# Patient Record
Sex: FEMALE | Race: BLACK OR AFRICAN AMERICAN
[De-identification: names, ages, dates, MRNs, and addresses within clinical notes are randomized per-mention and may not be internally consistent; named-entity substitution may affect disease eponyms.]

---

## 2019-02-21 ENCOUNTER — HOSPITAL ENCOUNTER (EMERGENCY)
Age: 44
LOS: 1 days | Discharge: HOME | End: 2019-02-22
Payer: MEDICAID

## 2019-02-21 VITALS
TEMPERATURE: 99.4 F | DIASTOLIC BLOOD PRESSURE: 76 MMHG | SYSTOLIC BLOOD PRESSURE: 119 MMHG | HEART RATE: 99 BPM | OXYGEN SATURATION: 99 % | RESPIRATION RATE: 18 BRPM

## 2019-02-21 VITALS — BODY MASS INDEX: 24.9 KG/M2

## 2019-02-21 DIAGNOSIS — J02.0: Primary | ICD-10-CM

## 2019-02-21 DIAGNOSIS — Z72.0: ICD-10-CM

## 2019-02-21 PROCEDURE — 87804 INFLUENZA ASSAY W/OPTIC: CPT

## 2019-02-21 PROCEDURE — 99283 EMERGENCY DEPT VISIT LOW MDM: CPT

## 2019-02-22 VITALS
HEART RATE: 89 BPM | RESPIRATION RATE: 20 BRPM | SYSTOLIC BLOOD PRESSURE: 116 MMHG | OXYGEN SATURATION: 99 % | DIASTOLIC BLOOD PRESSURE: 70 MMHG

## 2020-01-08 ENCOUNTER — HOSPITAL ENCOUNTER (OUTPATIENT)
Age: 45
End: 2020-01-08
Payer: MEDICAID

## 2020-01-08 VITALS — BODY MASS INDEX: 24.9 KG/M2

## 2020-01-08 DIAGNOSIS — Z12.4: Primary | ICD-10-CM

## 2020-01-08 PROCEDURE — 88175 CYTOPATH C/V AUTO FLUID REDO: CPT

## 2020-01-08 PROCEDURE — G0145 SCR C/V CYTO,THINLAYER,RESCR: HCPCS

## 2020-12-01 ENCOUNTER — HOSPITAL ENCOUNTER (OUTPATIENT)
Age: 45
End: 2020-12-01
Payer: MEDICAID

## 2020-12-01 VITALS — BODY MASS INDEX: 24.9 KG/M2

## 2020-12-01 DIAGNOSIS — L29.9: Primary | ICD-10-CM

## 2020-12-01 DIAGNOSIS — N03.8: ICD-10-CM

## 2020-12-01 LAB
ALANINE AMINOTRANSFER ALT/SGPT: 19 U/L (ref 13–56)
ALBUMIN SERPL-MCNC: 3.4 G/DL (ref 3.2–5)
ALKALINE PHOSPHATASE: 47 U/L (ref 45–117)
ANION GAP: 5 (ref 5–15)
AST(SGOT): 23 U/L (ref 15–37)
BUN SERPL-MCNC: 21 MG/DL (ref 7–18)
BUN/CREAT RATIO: 12.4 RATIO (ref 10–20)
CALCIUM SERPL-MCNC: 8.5 MG/DL (ref 8.5–10.1)
CARBON DIOXIDE: 23 MMOL/L (ref 21–32)
CHLORIDE: 112 MMOL/L (ref 98–107)
DEPRECATED RDW RBC: 42.1 FL (ref 35.1–43.9)
ERYTHROCYTE [DISTWIDTH] IN BLOOD: 13 % (ref 11.6–14.6)
EST GLOM FILT RATE - AFR AMER: 42 ML/MIN (ref 60–?)
FERRITIN SERPL-MCNC: 85 NG/ML (ref 8–252)
GLOBULIN: 4.3 G/DL (ref 2.2–4.2)
GLUCOSE: 81 MG/DL (ref 74–106)
HCT VFR BLD AUTO: 33.8 % (ref 37–47)
HEMOGLOBIN: 11.3 G/DL (ref 12–15)
HGB BLD-MCNC: 11.3 G/DL (ref 12–15)
IMMATURE GRANULOCYTES COUNT: 0.01 X10^3/UL (ref 0–0)
IRON SPEC-MCNT: 45 UG/DL (ref 50–170)
MCV RBC: 88.3 FL (ref 81–99)
MEAN CORP HGB CONC: 33.4 G/DL (ref 32–36)
MEAN PLATELET VOL.: 11.5 FL (ref 6.2–12)
NRBC FLAGGED BY ANALYZER: 0 % (ref 0–5)
PLATELET # BLD: 194 K/MM3 (ref 150–450)
PLATELET COUNT: 194 K/MM3 (ref 150–450)
POTASSIUM: 3.7 MMOL/L (ref 3.5–5.1)
RBC # BLD AUTO: 3.83 M/MM3 (ref 4.2–5.4)
RBC DISTRIBUTION WIDTH CV: 13 % (ref 11.6–14.6)
RBC DISTRIBUTION WIDTH SD: 42.1 FL (ref 35.1–43.9)
VITAMIN D,25 HYDROXY: 24.5 NG/ML
WBC # BLD AUTO: 6.8 K/MM3 (ref 4.4–11)
WHITE BLOOD COUNT: 6.8 K/MM3 (ref 4.4–11)

## 2020-12-01 PROCEDURE — 80053 COMPREHEN METABOLIC PANEL: CPT

## 2020-12-01 PROCEDURE — 84443 ASSAY THYROID STIM HORMONE: CPT

## 2020-12-01 PROCEDURE — 82728 ASSAY OF FERRITIN: CPT

## 2020-12-01 PROCEDURE — 83540 ASSAY OF IRON: CPT

## 2020-12-01 PROCEDURE — 85025 COMPLETE CBC W/AUTO DIFF WBC: CPT

## 2020-12-01 PROCEDURE — 82306 VITAMIN D 25 HYDROXY: CPT

## 2020-12-01 PROCEDURE — 85652 RBC SED RATE AUTOMATED: CPT

## 2020-12-01 PROCEDURE — 86003 ALLG SPEC IGE CRUDE XTRC EA: CPT

## 2020-12-01 PROCEDURE — 36415 COLL VENOUS BLD VENIPUNCTURE: CPT

## 2020-12-05 LAB
AMER ROACH IGE QN: <0.1 KU/L
CAT DANDER IGE QN: <0.1 KU/L
COMMON RAGWEED IGE QN: <0.1 KU/L
DEPRECATED IGE QN: <0.1 KU/L
DOG EPITH IGE QN: <0.1 KU/L
HAZELNUT POLN IGE QN: <0.1 KU/L
KENT BLUE GRASS IGE QN: <0.1 KU/L
MT JUNIPER IGE QN: <0.1 KU/L
PLANTAIN, ENGLISH: <0.1 KU/L
SHEEP SORREL IGE QN: <0.1 KU/L
SYCAMORE, AMERICAN: <0.1 KU/L
WHITE ELM IGE QN: <0.1 KU/L
WHITE HICKORY IGE QN: <0.1 KU/L
WHITE MULBERRY IGE QN: <0.1 KU/L
WHITE OAK IGE QN: <0.1 KU/L

## 2022-03-31 ENCOUNTER — HOSPITAL ENCOUNTER (OUTPATIENT)
Dept: HOSPITAL 100 - MFPLAB | Age: 47
Discharge: HOME | End: 2022-03-31
Payer: MEDICAID

## 2022-03-31 DIAGNOSIS — R53.83: Primary | ICD-10-CM

## 2022-03-31 DIAGNOSIS — Z13.220: ICD-10-CM

## 2022-03-31 LAB
ALANINE AMINOTRANSFER ALT/SGPT: 17 U/L (ref 13–56)
ALBUMIN SERPL-MCNC: 3.5 G/DL (ref 3.2–5)
ALKALINE PHOSPHATASE: 62 U/L (ref 45–117)
ANION GAP: 2 (ref 5–15)
AST(SGOT): 18 U/L (ref 15–37)
BUN SERPL-MCNC: 24 MG/DL (ref 7–18)
BUN/CREAT RATIO: 8.8 RATIO (ref 10–20)
CALCIUM SERPL-MCNC: 8.5 MG/DL (ref 8.5–10.1)
CARBON DIOXIDE: 22 MMOL/L (ref 21–32)
CHLORIDE: 115 MMOL/L (ref 98–107)
CHOLEST SERPL-MCNC: 199 MG/DL
DEPRECATED RDW RBC: 40.4 FL (ref 35.1–43.9)
ERYTHROCYTE [DISTWIDTH] IN BLOOD: 12.7 % (ref 11.6–14.6)
EST GLOM FILT RATE - AFR AMER: 24 ML/MIN (ref 60–?)
GLOBULIN: 4 G/DL (ref 2.2–4.2)
GLUCOSE: 95 MG/DL (ref 74–106)
HCT VFR BLD AUTO: 32.8 % (ref 37–47)
HEMOGLOBIN: 11.2 G/DL (ref 12–15)
HGB BLD-MCNC: 11.2 G/DL (ref 12–15)
IMMATURE GRANULOCYTES COUNT: 0.02 X10^3/UL (ref 0–0)
IRON SPEC-MCNT: 67 UG/DL (ref 50–170)
MCV RBC: 87 FL (ref 81–99)
MEAN CORP HGB CONC: 34.1 G/DL (ref 32–36)
MEAN PLATELET VOL.: 11.2 FL (ref 6.2–12)
NRBC FLAGGED BY ANALYZER: 0 % (ref 0–5)
PLATELET # BLD: 233 K/MM3 (ref 150–450)
PLATELET COUNT: 233 K/MM3 (ref 150–450)
POTASSIUM: 3.8 MMOL/L (ref 3.5–5.1)
RBC # BLD AUTO: 3.77 M/MM3 (ref 4.2–5.4)
RBC DISTRIBUTION WIDTH CV: 12.7 % (ref 11.6–14.6)
RBC DISTRIBUTION WIDTH SD: 40.4 FL (ref 35.1–43.9)
TRIGLYCERIDES: 80 MG/DL
VITAMIN B12: 555 PG/ML (ref 211–911)
VITAMIN D,25 HYDROXY: 14.3 NG/ML
VLDLC SERPL-MCNC: 16 MG/DL (ref 5–40)
WBC # BLD AUTO: 7.1 K/MM3 (ref 4.4–11)
WHITE BLOOD COUNT: 7.1 K/MM3 (ref 4.4–11)

## 2022-03-31 PROCEDURE — 83540 ASSAY OF IRON: CPT

## 2022-03-31 PROCEDURE — 85652 RBC SED RATE AUTOMATED: CPT

## 2022-03-31 PROCEDURE — 80053 COMPREHEN METABOLIC PANEL: CPT

## 2022-03-31 PROCEDURE — 36415 COLL VENOUS BLD VENIPUNCTURE: CPT

## 2022-03-31 PROCEDURE — 82306 VITAMIN D 25 HYDROXY: CPT

## 2022-03-31 PROCEDURE — 85025 COMPLETE CBC W/AUTO DIFF WBC: CPT

## 2022-03-31 PROCEDURE — 86038 ANTINUCLEAR ANTIBODIES: CPT

## 2022-03-31 PROCEDURE — 82607 VITAMIN B-12: CPT

## 2022-03-31 PROCEDURE — 84443 ASSAY THYROID STIM HORMONE: CPT

## 2022-03-31 PROCEDURE — 80061 LIPID PANEL: CPT

## 2023-02-23 ENCOUNTER — HOSPITAL ENCOUNTER (OUTPATIENT)
Dept: HOSPITAL 100 - MFPLAB | Age: 48
Discharge: HOME | End: 2023-02-23
Payer: MEDICAID

## 2023-02-23 DIAGNOSIS — D64.9: Primary | ICD-10-CM

## 2023-02-23 DIAGNOSIS — R68.89: ICD-10-CM

## 2023-02-23 LAB
ANION GAP: 8 (ref 5–15)
BUN SERPL-MCNC: 57 MG/DL (ref 7–18)
BUN/CREAT RATIO: 9.7 RATIO (ref 10–20)
CALCIUM SERPL-MCNC: 7.7 MG/DL (ref 8.5–10.1)
CARBON DIOXIDE: 18 MMOL/L (ref 21–32)
CHLORIDE: 114 MMOL/L (ref 98–107)
DEPRECATED RDW RBC: 43.8 FL (ref 35.1–43.9)
ERYTHROCYTE [DISTWIDTH] IN BLOOD: 13.4 % (ref 11.6–14.6)
EST GLOM FILT RATE - AFR AMER: 10 ML/MIN (ref 60–?)
FERRITIN SERPL-MCNC: 150 NG/ML (ref 8–252)
GLUCOSE: 115 MG/DL (ref 74–106)
HCT VFR BLD AUTO: 25.2 % (ref 37–47)
HEMOGLOBIN: 8 G/DL (ref 12–15)
HGB BLD-MCNC: 8 G/DL (ref 12–15)
IMMATURE GRANULOCYTES COUNT: 0.02 X10^3/UL (ref 0–0)
IRON SPEC-MCNT: 72 UG/DL (ref 50–170)
MCV RBC: 89.4 FL (ref 81–99)
MEAN CORP HGB CONC: 31.7 G/DL (ref 32–36)
MEAN PLATELET VOL.: 11.7 FL (ref 6.2–12)
NRBC FLAGGED BY ANALYZER: 0 % (ref 0–5)
PLATELET # BLD: 205 K/MM3 (ref 150–450)
PLATELET COUNT: 205 K/MM3 (ref 150–450)
POTASSIUM: 3.5 MMOL/L (ref 3.5–5.1)
RBC # BLD AUTO: 2.82 M/MM3 (ref 4.2–5.4)
RBC DISTRIBUTION WIDTH CV: 13.4 % (ref 11.6–14.6)
RBC DISTRIBUTION WIDTH SD: 43.8 FL (ref 35.1–43.9)
WBC # BLD AUTO: 7.3 K/MM3 (ref 4.4–11)
WHITE BLOOD COUNT: 7.3 K/MM3 (ref 4.4–11)

## 2023-02-23 PROCEDURE — 36415 COLL VENOUS BLD VENIPUNCTURE: CPT

## 2023-02-23 PROCEDURE — 80048 BASIC METABOLIC PNL TOTAL CA: CPT

## 2023-02-23 PROCEDURE — 82728 ASSAY OF FERRITIN: CPT

## 2023-02-23 PROCEDURE — 85025 COMPLETE CBC W/AUTO DIFF WBC: CPT

## 2023-02-23 PROCEDURE — 85045 AUTOMATED RETICULOCYTE COUNT: CPT

## 2023-02-23 PROCEDURE — 83550 IRON BINDING TEST: CPT

## 2023-02-23 PROCEDURE — 83540 ASSAY OF IRON: CPT

## 2023-02-23 PROCEDURE — 84443 ASSAY THYROID STIM HORMONE: CPT

## 2023-02-24 LAB
IMMATURE RETICULOCYTE FRACTION: 10.5 % (ref 3–15.9)
IRON BINDING CAPACITY,TOTAL: 246 UG/DL (ref 250–450)
RETICS # AUTO: 1.31 % (ref 0.5–1.5)

## 2024-01-09 ENCOUNTER — HOSPITAL ENCOUNTER (OUTPATIENT)
Dept: HOSPITAL 100 - MFPLAB | Age: 49
Discharge: HOME | End: 2024-01-09
Payer: MEDICAID

## 2024-01-09 DIAGNOSIS — D64.9: ICD-10-CM

## 2024-01-09 DIAGNOSIS — J30.9: Primary | ICD-10-CM

## 2024-01-09 LAB
DEPRECATED RDW RBC: 42.1 FL (ref 35.1–43.9)
ERYTHROCYTE [DISTWIDTH] IN BLOOD: 13.7 % (ref 11.6–14.6)
FERRITIN SERPL-MCNC: 175 NG/ML (ref 8–252)
HCT VFR BLD AUTO: 20.2 % (ref 37–47)
HGB BLD-MCNC: 6.5 G/DL (ref 12–15)
IRON SPEC-MCNT: 89 UG/DL (ref 50–170)
MCV RBC: 84.5 FL (ref 81–99)
MEAN CORP HGB CONC: 32.2 G/DL (ref 32–36)
MEAN PLATELET VOL.: 11.9 FL (ref 6.2–12)
PLATELET # BLD: 157 K/MM3 (ref 150–450)
RBC # BLD AUTO: 2.39 M/MM3 (ref 4.2–5.4)
WBC # BLD AUTO: 7.4 K/MM3 (ref 4.4–11)

## 2024-01-09 PROCEDURE — 85027 COMPLETE CBC AUTOMATED: CPT

## 2024-01-09 PROCEDURE — 83540 ASSAY OF IRON: CPT

## 2024-01-09 PROCEDURE — 36415 COLL VENOUS BLD VENIPUNCTURE: CPT

## 2024-01-09 PROCEDURE — 82728 ASSAY OF FERRITIN: CPT

## 2024-01-10 ENCOUNTER — HOSPITAL ENCOUNTER (INPATIENT)
Dept: HOSPITAL 100 - ED | Age: 49
LOS: 6 days | Discharge: HOME | DRG: 470 | End: 2024-01-16
Payer: MEDICAID

## 2024-01-10 VITALS
SYSTOLIC BLOOD PRESSURE: 165 MMHG | RESPIRATION RATE: 12 BRPM | OXYGEN SATURATION: 100 % | HEART RATE: 70 BPM | DIASTOLIC BLOOD PRESSURE: 95 MMHG

## 2024-01-10 VITALS — BODY MASS INDEX: 23.1 KG/M2 | BODY MASS INDEX: 20.8 KG/M2 | BODY MASS INDEX: 21 KG/M2

## 2024-01-10 VITALS — HEART RATE: 71 BPM | DIASTOLIC BLOOD PRESSURE: 96 MMHG | SYSTOLIC BLOOD PRESSURE: 187 MMHG

## 2024-01-10 VITALS — DIASTOLIC BLOOD PRESSURE: 88 MMHG | HEART RATE: 70 BPM | RESPIRATION RATE: 17 BRPM | SYSTOLIC BLOOD PRESSURE: 173 MMHG

## 2024-01-10 VITALS — SYSTOLIC BLOOD PRESSURE: 187 MMHG | HEART RATE: 66 BPM | DIASTOLIC BLOOD PRESSURE: 100 MMHG

## 2024-01-10 VITALS
SYSTOLIC BLOOD PRESSURE: 184 MMHG | DIASTOLIC BLOOD PRESSURE: 91 MMHG | HEART RATE: 63 BPM | RESPIRATION RATE: 15 BRPM | OXYGEN SATURATION: 100 %

## 2024-01-10 VITALS
TEMPERATURE: 96.9 F | RESPIRATION RATE: 18 BRPM | SYSTOLIC BLOOD PRESSURE: 179 MMHG | DIASTOLIC BLOOD PRESSURE: 97 MMHG | OXYGEN SATURATION: 100 % | HEART RATE: 70 BPM

## 2024-01-10 VITALS — TEMPERATURE: 98.4 F

## 2024-01-10 VITALS
RESPIRATION RATE: 13 BRPM | SYSTOLIC BLOOD PRESSURE: 160 MMHG | HEART RATE: 64 BPM | OXYGEN SATURATION: 100 % | DIASTOLIC BLOOD PRESSURE: 91 MMHG

## 2024-01-10 VITALS
HEART RATE: 89 BPM | RESPIRATION RATE: 14 BRPM | DIASTOLIC BLOOD PRESSURE: 70 MMHG | SYSTOLIC BLOOD PRESSURE: 138 MMHG | OXYGEN SATURATION: 100 %

## 2024-01-10 DIAGNOSIS — Z79.899: ICD-10-CM

## 2024-01-10 DIAGNOSIS — N18.6: ICD-10-CM

## 2024-01-10 DIAGNOSIS — E83.51: ICD-10-CM

## 2024-01-10 DIAGNOSIS — L29.9: ICD-10-CM

## 2024-01-10 DIAGNOSIS — E87.22: ICD-10-CM

## 2024-01-10 DIAGNOSIS — R20.0: ICD-10-CM

## 2024-01-10 DIAGNOSIS — E83.39: ICD-10-CM

## 2024-01-10 DIAGNOSIS — Z99.2: ICD-10-CM

## 2024-01-10 DIAGNOSIS — R19.7: ICD-10-CM

## 2024-01-10 DIAGNOSIS — N17.9: ICD-10-CM

## 2024-01-10 DIAGNOSIS — D63.1: ICD-10-CM

## 2024-01-10 DIAGNOSIS — F17.200: ICD-10-CM

## 2024-01-10 DIAGNOSIS — I12.0: Primary | ICD-10-CM

## 2024-01-10 LAB
ALANINE AMINOTRANSFER ALT/SGPT: 8 U/L (ref 13–56)
ALBUMIN SERPL-MCNC: 3.1 G/DL (ref 3.2–5)
ALKALINE PHOSPHATASE: 127 U/L (ref 45–117)
ANION GAP: 14 (ref 5–15)
AST(SGOT): 13 U/L (ref 15–37)
BUN SERPL-MCNC: 93 MG/DL (ref 7–18)
BUN/CREAT RATIO: 4.9 RATIO (ref 10–20)
CALCIUM SERPL-MCNC: < 5 MG/DL (ref 8.5–10.1)
CARBON DIOXIDE: 12 MMOL/L (ref 21–32)
CHLORIDE: 104 MMOL/L (ref 98–107)
DEPRECATED RDW RBC: 41.1 FL (ref 35.1–43.9)
ERYTHROCYTE [DISTWIDTH] IN BLOOD: 13.8 % (ref 11.6–14.6)
EST GLOM FILT RATE - AFR AMER: 3 ML/MIN (ref 60–?)
ESTIMATED CREATININE CLEARANCE: 3.11 ML/MIN
GLOBULIN: 3.6 G/DL (ref 2.2–4.2)
GLUCOSE: 94 MG/DL (ref 74–106)
HCT VFR BLD AUTO: 19.6 % (ref 37–47)
HGB BLD-MCNC: 6.5 G/DL (ref 12–15)
IMMATURE GRANULOCYTES COUNT: 0.03 X10^3/UL (ref 0–0)
LEUKOCYTE ESTERASE UR QL STRIP: 25 /UL
MCV RBC: 82.4 FL (ref 81–99)
MEAN CORP HGB CONC: 33.2 G/DL (ref 32–36)
MEAN PLATELET VOL.: 12.1 FL (ref 6.2–12)
MUCOUS THREADS URNS QL MICRO: (no result) /HPF
NRBC FLAGGED BY ANALYZER: 0 % (ref 0–5)
PLATELET # BLD: 156 K/MM3 (ref 150–450)
POTASSIUM: 3.8 MMOL/L (ref 3.5–5.1)
PROT UR QL STRIP.AUTO: 500 MG/DL
RBC # BLD AUTO: 2.38 M/MM3 (ref 4.2–5.4)
RBC UR QL: (no result) /HPF (ref 0–5)
RBC UR QL: 50 /UL
SP GR UR: 1.01 (ref 1–1.03)
SQUAMOUS URNS QL MICRO: (no result) /HPF (ref 5–10)
URINE PRESERVATIVE: (no result)
WBC # BLD AUTO: 7.3 K/MM3 (ref 4.4–11)

## 2024-01-10 PROCEDURE — 81001 URINALYSIS AUTO W/SCOPE: CPT

## 2024-01-10 PROCEDURE — P9016 RBC LEUKOCYTES REDUCED: HCPCS

## 2024-01-10 PROCEDURE — 86900 BLOOD TYPING SEROLOGIC ABO: CPT

## 2024-01-10 PROCEDURE — 99285 EMERGENCY DEPT VISIT HI MDM: CPT

## 2024-01-10 PROCEDURE — 71045 X-RAY EXAM CHEST 1 VIEW: CPT

## 2024-01-10 PROCEDURE — 97165 OT EVAL LOW COMPLEX 30 MIN: CPT

## 2024-01-10 PROCEDURE — G0257 UNSCHED DIALYSIS ESRD PT HOS: HCPCS

## 2024-01-10 PROCEDURE — 99406 BEHAV CHNG SMOKING 3-10 MIN: CPT

## 2024-01-10 PROCEDURE — C1750 CATH, HEMODIALYSIS,LONG-TERM: HCPCS

## 2024-01-10 PROCEDURE — 82728 ASSAY OF FERRITIN: CPT

## 2024-01-10 PROCEDURE — 97803 MED NUTRITION INDIV SUBSEQ: CPT

## 2024-01-10 PROCEDURE — 85610 PROTHROMBIN TIME: CPT

## 2024-01-10 PROCEDURE — 36415 COLL VENOUS BLD VENIPUNCTURE: CPT

## 2024-01-10 PROCEDURE — 74176 CT ABD & PELVIS W/O CONTRAST: CPT

## 2024-01-10 PROCEDURE — 80053 COMPREHEN METABOLIC PANEL: CPT

## 2024-01-10 PROCEDURE — 97162 PT EVAL MOD COMPLEX 30 MIN: CPT

## 2024-01-10 PROCEDURE — 86850 RBC ANTIBODY SCREEN: CPT

## 2024-01-10 PROCEDURE — 86901 BLOOD TYPING SEROLOGIC RH(D): CPT

## 2024-01-10 PROCEDURE — 85027 COMPLETE CBC AUTOMATED: CPT

## 2024-01-10 PROCEDURE — 83550 IRON BINDING TEST: CPT

## 2024-01-10 PROCEDURE — 87340 HEPATITIS B SURFACE AG IA: CPT

## 2024-01-10 PROCEDURE — 85730 THROMBOPLASTIN TIME PARTIAL: CPT

## 2024-01-10 PROCEDURE — 83540 ASSAY OF IRON: CPT

## 2024-01-10 PROCEDURE — 97802 MEDICAL NUTRITION INDIV IN: CPT

## 2024-01-10 PROCEDURE — 90937 HEMODIALYSIS REPEATED EVAL: CPT

## 2024-01-10 PROCEDURE — 77001 FLUOROGUIDE FOR VEIN DEVICE: CPT

## 2024-01-10 PROCEDURE — 83970 ASSAY OF PARATHORMONE: CPT

## 2024-01-10 PROCEDURE — 86920 COMPATIBILITY TEST SPIN: CPT

## 2024-01-10 PROCEDURE — 93005 ELECTROCARDIOGRAM TRACING: CPT

## 2024-01-10 PROCEDURE — A4216 STERILE WATER/SALINE, 10 ML: HCPCS

## 2024-01-10 PROCEDURE — 82274 ASSAY TEST FOR BLOOD FECAL: CPT

## 2024-01-10 PROCEDURE — 70450 CT HEAD/BRAIN W/O DYE: CPT

## 2024-01-10 PROCEDURE — 80069 RENAL FUNCTION PANEL: CPT

## 2024-01-10 PROCEDURE — 85025 COMPLETE CBC W/AUTO DIFF WBC: CPT

## 2024-01-10 RX ADMIN — SODIUM CHLORIDE 1000 ML: 9 INJECTION, SOLUTION INTRAVENOUS at 21:09

## 2024-01-11 VITALS
HEART RATE: 75 BPM | DIASTOLIC BLOOD PRESSURE: 84 MMHG | RESPIRATION RATE: 16 BRPM | TEMPERATURE: 98.78 F | OXYGEN SATURATION: 100 % | SYSTOLIC BLOOD PRESSURE: 154 MMHG

## 2024-01-11 VITALS
HEART RATE: 86 BPM | OXYGEN SATURATION: 100 % | SYSTOLIC BLOOD PRESSURE: 148 MMHG | TEMPERATURE: 98.24 F | RESPIRATION RATE: 16 BRPM | DIASTOLIC BLOOD PRESSURE: 70 MMHG

## 2024-01-11 VITALS
SYSTOLIC BLOOD PRESSURE: 146 MMHG | TEMPERATURE: 99 F | RESPIRATION RATE: 16 BRPM | HEART RATE: 90 BPM | DIASTOLIC BLOOD PRESSURE: 68 MMHG | OXYGEN SATURATION: 100 %

## 2024-01-11 VITALS
SYSTOLIC BLOOD PRESSURE: 147 MMHG | DIASTOLIC BLOOD PRESSURE: 81 MMHG | RESPIRATION RATE: 16 BRPM | TEMPERATURE: 98.42 F | OXYGEN SATURATION: 100 % | HEART RATE: 81 BPM

## 2024-01-11 VITALS
HEART RATE: 86 BPM | RESPIRATION RATE: 16 BRPM | DIASTOLIC BLOOD PRESSURE: 70 MMHG | OXYGEN SATURATION: 100 % | TEMPERATURE: 98.24 F | SYSTOLIC BLOOD PRESSURE: 148 MMHG

## 2024-01-11 VITALS
OXYGEN SATURATION: 100 % | TEMPERATURE: 98.7 F | HEART RATE: 86 BPM | DIASTOLIC BLOOD PRESSURE: 70 MMHG | RESPIRATION RATE: 16 BRPM | SYSTOLIC BLOOD PRESSURE: 150 MMHG

## 2024-01-11 VITALS
OXYGEN SATURATION: 100 % | DIASTOLIC BLOOD PRESSURE: 62 MMHG | RESPIRATION RATE: 18 BRPM | SYSTOLIC BLOOD PRESSURE: 153 MMHG | TEMPERATURE: 97.5 F | HEART RATE: 88 BPM

## 2024-01-11 VITALS
HEART RATE: 80 BPM | DIASTOLIC BLOOD PRESSURE: 80 MMHG | SYSTOLIC BLOOD PRESSURE: 144 MMHG | TEMPERATURE: 98.42 F | RESPIRATION RATE: 16 BRPM | OXYGEN SATURATION: 100 %

## 2024-01-11 VITALS
SYSTOLIC BLOOD PRESSURE: 139 MMHG | OXYGEN SATURATION: 100 % | TEMPERATURE: 98.3 F | RESPIRATION RATE: 16 BRPM | DIASTOLIC BLOOD PRESSURE: 76 MMHG | HEART RATE: 88 BPM

## 2024-01-11 VITALS
DIASTOLIC BLOOD PRESSURE: 76 MMHG | HEART RATE: 78 BPM | SYSTOLIC BLOOD PRESSURE: 156 MMHG | OXYGEN SATURATION: 100 % | TEMPERATURE: 98.7 F | RESPIRATION RATE: 16 BRPM

## 2024-01-11 VITALS
RESPIRATION RATE: 16 BRPM | OXYGEN SATURATION: 100 % | TEMPERATURE: 98.24 F | HEART RATE: 85 BPM | SYSTOLIC BLOOD PRESSURE: 134 MMHG | DIASTOLIC BLOOD PRESSURE: 77 MMHG

## 2024-01-11 VITALS — OXYGEN SATURATION: 95 %

## 2024-01-11 LAB
ALBUMIN SERPL-MCNC: 2.8 G/DL (ref 3.2–5)
BUN SERPL-MCNC: 95 MG/DL (ref 7–18)
BUN/CREAT RATIO: 5.1 RATIO (ref 10–20)
CALCIUM SERPL-MCNC: 5.2 MG/DL (ref 8.5–10.1)
CARBON DIOXIDE: 9 MMOL/L (ref 21–32)
CHLORIDE: 108 MMOL/L (ref 98–107)
DEPRECATED RDW RBC: 46.2 FL (ref 35.1–43.9)
ERYTHROCYTE [DISTWIDTH] IN BLOOD: 15.1 % (ref 11.6–14.6)
EST GLOM FILT RATE - AFR AMER: 3 ML/MIN (ref 60–?)
ESTIMATED CREATININE CLEARANCE: 3.33 ML/MIN
FERRITIN SERPL-MCNC: 196 NG/ML (ref 8–252)
GLUCOSE: 95 MG/DL (ref 74–106)
HCT VFR BLD AUTO: 25.9 % (ref 37–47)
HGB BLD-MCNC: 9 G/DL (ref 12–15)
IMMATURE GRANULOCYTES COUNT: 0.1 X10^3/UL (ref 0–0)
IRON BINDING CAPACITY,TOTAL: 182 UG/DL (ref 250–450)
IRON SATN MFR SERPL: 51.6 % (ref 15–55)
IRON SPEC-MCNT: 94 UG/DL (ref 50–170)
MCV RBC: 84.9 FL (ref 81–99)
MEAN CORP HGB CONC: 34.7 G/DL (ref 32–36)
MEAN PLATELET VOL.: 10.4 FL (ref 6.2–12)
NRBC FLAGGED BY ANALYZER: 0 % (ref 0–5)
PLATELET # BLD: 113 K/MM3 (ref 150–450)
POTASSIUM: 3.7 MMOL/L (ref 3.5–5.1)
PROTHROMBIN TIME (PROTIME)PT.: 15.9 SECONDS (ref 11.7–14.9)
RBC # BLD AUTO: 3.05 M/MM3 (ref 4.2–5.4)
WBC # BLD AUTO: 9.3 K/MM3 (ref 4.4–11)

## 2024-01-11 RX ADMIN — PANTOPRAZOLE SODIUM 330 MG: 40 INJECTION, POWDER, FOR SOLUTION INTRAVENOUS at 21:00

## 2024-01-11 RX ADMIN — Medication 120 ML: at 09:55

## 2024-01-11 RX ADMIN — SODIUM CHLORIDE 125 ML: 9 INJECTION, SOLUTION INTRAVENOUS at 00:55

## 2024-01-11 RX ADMIN — PANTOPRAZOLE SODIUM 330 MG: 40 INJECTION, POWDER, FOR SOLUTION INTRAVENOUS at 01:11

## 2024-01-11 RX ADMIN — CALCIUM GLUCONATE 60 GM: 98 INJECTION, SOLUTION INTRAVENOUS at 01:34

## 2024-01-11 RX ADMIN — CALCIUM GLUCONATE 60 GM: 98 INJECTION, SOLUTION INTRAVENOUS at 14:09

## 2024-01-11 RX ADMIN — PANTOPRAZOLE SODIUM 330 MG: 40 INJECTION, POWDER, FOR SOLUTION INTRAVENOUS at 11:54

## 2024-01-12 VITALS
OXYGEN SATURATION: 100 % | HEART RATE: 75 BPM | SYSTOLIC BLOOD PRESSURE: 125 MMHG | RESPIRATION RATE: 18 BRPM | DIASTOLIC BLOOD PRESSURE: 83 MMHG

## 2024-01-12 VITALS
RESPIRATION RATE: 18 BRPM | HEART RATE: 77 BPM | OXYGEN SATURATION: 100 % | DIASTOLIC BLOOD PRESSURE: 75 MMHG | SYSTOLIC BLOOD PRESSURE: 125 MMHG

## 2024-01-12 VITALS
TEMPERATURE: 98.7 F | HEART RATE: 92 BPM | OXYGEN SATURATION: 100 % | RESPIRATION RATE: 14 BRPM | DIASTOLIC BLOOD PRESSURE: 58 MMHG | SYSTOLIC BLOOD PRESSURE: 125 MMHG

## 2024-01-12 VITALS — HEART RATE: 67 BPM | RESPIRATION RATE: 14 BRPM | SYSTOLIC BLOOD PRESSURE: 157 MMHG | DIASTOLIC BLOOD PRESSURE: 94 MMHG

## 2024-01-12 VITALS
TEMPERATURE: 98.42 F | HEART RATE: 73 BPM | SYSTOLIC BLOOD PRESSURE: 170 MMHG | DIASTOLIC BLOOD PRESSURE: 106 MMHG | RESPIRATION RATE: 16 BRPM

## 2024-01-12 VITALS
DIASTOLIC BLOOD PRESSURE: 58 MMHG | SYSTOLIC BLOOD PRESSURE: 122 MMHG | OXYGEN SATURATION: 100 % | RESPIRATION RATE: 18 BRPM | HEART RATE: 89 BPM

## 2024-01-12 VITALS
SYSTOLIC BLOOD PRESSURE: 125 MMHG | TEMPERATURE: 98.2 F | RESPIRATION RATE: 16 BRPM | DIASTOLIC BLOOD PRESSURE: 58 MMHG | OXYGEN SATURATION: 100 % | HEART RATE: 84 BPM

## 2024-01-12 VITALS — HEART RATE: 72 BPM | RESPIRATION RATE: 14 BRPM | DIASTOLIC BLOOD PRESSURE: 87 MMHG | SYSTOLIC BLOOD PRESSURE: 154 MMHG

## 2024-01-12 VITALS
TEMPERATURE: 98.24 F | DIASTOLIC BLOOD PRESSURE: 73 MMHG | SYSTOLIC BLOOD PRESSURE: 135 MMHG | HEART RATE: 81 BPM | RESPIRATION RATE: 16 BRPM | OXYGEN SATURATION: 100 %

## 2024-01-12 VITALS — SYSTOLIC BLOOD PRESSURE: 161 MMHG | RESPIRATION RATE: 15 BRPM | HEART RATE: 71 BPM | DIASTOLIC BLOOD PRESSURE: 87 MMHG

## 2024-01-12 VITALS
RESPIRATION RATE: 14 BRPM | DIASTOLIC BLOOD PRESSURE: 99 MMHG | OXYGEN SATURATION: 96 % | HEART RATE: 67 BPM | SYSTOLIC BLOOD PRESSURE: 165 MMHG

## 2024-01-12 VITALS
SYSTOLIC BLOOD PRESSURE: 125 MMHG | TEMPERATURE: 96.62 F | OXYGEN SATURATION: 100 % | HEART RATE: 74 BPM | DIASTOLIC BLOOD PRESSURE: 58 MMHG | RESPIRATION RATE: 18 BRPM

## 2024-01-12 VITALS
OXYGEN SATURATION: 100 % | SYSTOLIC BLOOD PRESSURE: 131 MMHG | TEMPERATURE: 98 F | RESPIRATION RATE: 16 BRPM | DIASTOLIC BLOOD PRESSURE: 81 MMHG | HEART RATE: 86 BPM

## 2024-01-12 VITALS — HEART RATE: 69 BPM | RESPIRATION RATE: 14 BRPM | DIASTOLIC BLOOD PRESSURE: 89 MMHG | SYSTOLIC BLOOD PRESSURE: 150 MMHG

## 2024-01-12 VITALS — HEART RATE: 86 BPM | SYSTOLIC BLOOD PRESSURE: 159 MMHG | RESPIRATION RATE: 14 BRPM | DIASTOLIC BLOOD PRESSURE: 89 MMHG

## 2024-01-12 VITALS — RESPIRATION RATE: 16 BRPM

## 2024-01-12 VITALS — HEART RATE: 75 BPM | DIASTOLIC BLOOD PRESSURE: 96 MMHG | SYSTOLIC BLOOD PRESSURE: 174 MMHG

## 2024-01-12 LAB
ALBUMIN SERPL-MCNC: 2.6 G/DL (ref 3.2–5)
APTT PPP: 38 SECONDS (ref 24.1–36.2)
BUN SERPL-MCNC: 94 MG/DL (ref 7–18)
BUN/CREAT RATIO: 4.8 RATIO (ref 10–20)
CALCIUM SERPL-MCNC: 5 MG/DL (ref 8.5–10.1)
CARBON DIOXIDE: 8 MMOL/L (ref 21–32)
CHLORIDE: 110 MMOL/L (ref 98–107)
DEPRECATED RDW RBC: 43.5 FL (ref 35.1–43.9)
ERYTHROCYTE [DISTWIDTH] IN BLOOD: 14.1 % (ref 11.6–14.6)
EST GLOM FILT RATE - AFR AMER: 3 ML/MIN (ref 60–?)
ESTIMATED CREATININE CLEARANCE: 3.17 ML/MIN
GLUCOSE: 94 MG/DL (ref 74–106)
HCT VFR BLD AUTO: 23.7 % (ref 37–47)
HGB BLD-MCNC: 8.1 G/DL (ref 12–15)
MCV RBC: 84.9 FL (ref 81–99)
MEAN CORP HGB CONC: 34.2 G/DL (ref 32–36)
MEAN PLATELET VOL.: 11.1 FL (ref 6.2–12)
PLATELET # BLD: 108 K/MM3 (ref 150–450)
POTASSIUM: 3.9 MMOL/L (ref 3.5–5.1)
PROTHROMBIN TIME (PROTIME)PT.: 15.6 SECONDS (ref 11.7–14.9)
RBC # BLD AUTO: 2.79 M/MM3 (ref 4.2–5.4)
WBC # BLD AUTO: 8.6 K/MM3 (ref 4.4–11)

## 2024-01-12 PROCEDURE — 02HV33Z INSERTION OF INFUSION DEVICE INTO SUPERIOR VENA CAVA, PERCUTANEOUS APPROACH: ICD-10-PCS | Performed by: SURGERY

## 2024-01-12 RX ADMIN — SODIUM CHLORIDE, PRESERVATIVE FREE 0 ML: 5 INJECTION INTRAVENOUS at 17:54

## 2024-01-12 RX ADMIN — LIDOCAINE HYDROCHLORIDE 20 ML: 10 INJECTION, SOLUTION INFILTRATION; PERINEURAL at 14:54

## 2024-01-12 RX ADMIN — Medication 120 ML: at 18:15

## 2024-01-12 RX ADMIN — CALCIUM GLUCONATE 60 GM: 98 INJECTION, SOLUTION INTRAVENOUS at 18:15

## 2024-01-12 RX ADMIN — CLINDAMYCIN IN 5 PERCENT DEXTROSE 75 MG: 18 INJECTION, SOLUTION INTRAVENOUS at 06:24

## 2024-01-12 RX ADMIN — PANTOPRAZOLE SODIUM 330 MG: 40 INJECTION, POWDER, FOR SOLUTION INTRAVENOUS at 22:38

## 2024-01-12 RX ADMIN — SODIUM CHLORIDE 15 ML: 9 INJECTION, SOLUTION INTRAVENOUS at 13:21

## 2024-01-12 RX ADMIN — CLINDAMYCIN IN 5 PERCENT DEXTROSE 75 MG: 18 INJECTION, SOLUTION INTRAVENOUS at 14:20

## 2024-01-12 RX ADMIN — CALCIUM GLUCONATE 60 GM: 98 INJECTION, SOLUTION INTRAVENOUS at 09:56

## 2024-01-12 RX ADMIN — Medication 6 BAG: at 16:25

## 2024-01-12 RX ADMIN — PANTOPRAZOLE SODIUM 330 MG: 40 INJECTION, POWDER, FOR SOLUTION INTRAVENOUS at 09:34

## 2024-01-13 VITALS
TEMPERATURE: 97.88 F | RESPIRATION RATE: 18 BRPM | HEART RATE: 93 BPM | SYSTOLIC BLOOD PRESSURE: 160 MMHG | DIASTOLIC BLOOD PRESSURE: 83 MMHG | OXYGEN SATURATION: 97 %

## 2024-01-13 VITALS
HEART RATE: 72 BPM | DIASTOLIC BLOOD PRESSURE: 100 MMHG | RESPIRATION RATE: 8 BRPM | SYSTOLIC BLOOD PRESSURE: 183 MMHG | OXYGEN SATURATION: 100 %

## 2024-01-13 VITALS
TEMPERATURE: 98 F | OXYGEN SATURATION: 96 % | HEART RATE: 77 BPM | DIASTOLIC BLOOD PRESSURE: 85 MMHG | RESPIRATION RATE: 16 BRPM | SYSTOLIC BLOOD PRESSURE: 160 MMHG

## 2024-01-13 VITALS
DIASTOLIC BLOOD PRESSURE: 83 MMHG | TEMPERATURE: 98.96 F | HEART RATE: 88 BPM | SYSTOLIC BLOOD PRESSURE: 145 MMHG | OXYGEN SATURATION: 100 % | RESPIRATION RATE: 16 BRPM

## 2024-01-13 VITALS
DIASTOLIC BLOOD PRESSURE: 94 MMHG | HEART RATE: 64 BPM | OXYGEN SATURATION: 100 % | RESPIRATION RATE: 18 BRPM | SYSTOLIC BLOOD PRESSURE: 186 MMHG

## 2024-01-13 VITALS
SYSTOLIC BLOOD PRESSURE: 178 MMHG | RESPIRATION RATE: 18 BRPM | OXYGEN SATURATION: 100 % | HEART RATE: 63 BPM | DIASTOLIC BLOOD PRESSURE: 99 MMHG

## 2024-01-13 VITALS
OXYGEN SATURATION: 100 % | SYSTOLIC BLOOD PRESSURE: 199 MMHG | RESPIRATION RATE: 18 BRPM | DIASTOLIC BLOOD PRESSURE: 106 MMHG | HEART RATE: 66 BPM

## 2024-01-13 VITALS
DIASTOLIC BLOOD PRESSURE: 90 MMHG | SYSTOLIC BLOOD PRESSURE: 170 MMHG | RESPIRATION RATE: 16 BRPM | TEMPERATURE: 98.24 F | OXYGEN SATURATION: 97 % | HEART RATE: 74 BPM

## 2024-01-13 VITALS
SYSTOLIC BLOOD PRESSURE: 145 MMHG | RESPIRATION RATE: 18 BRPM | DIASTOLIC BLOOD PRESSURE: 76 MMHG | TEMPERATURE: 98.24 F | OXYGEN SATURATION: 99 % | HEART RATE: 84 BPM

## 2024-01-13 VITALS — OXYGEN SATURATION: 96 %

## 2024-01-13 VITALS — DIASTOLIC BLOOD PRESSURE: 85 MMHG | SYSTOLIC BLOOD PRESSURE: 167 MMHG

## 2024-01-13 VITALS
OXYGEN SATURATION: 100 % | HEART RATE: 68 BPM | RESPIRATION RATE: 18 BRPM | SYSTOLIC BLOOD PRESSURE: 186 MMHG | DIASTOLIC BLOOD PRESSURE: 105 MMHG

## 2024-01-13 VITALS
SYSTOLIC BLOOD PRESSURE: 171 MMHG | RESPIRATION RATE: 18 BRPM | HEART RATE: 89 BPM | OXYGEN SATURATION: 100 % | DIASTOLIC BLOOD PRESSURE: 100 MMHG

## 2024-01-13 VITALS — OXYGEN SATURATION: 100 %

## 2024-01-13 LAB
ALBUMIN SERPL-MCNC: 2.4 G/DL (ref 3.2–5)
BUN SERPL-MCNC: 81 MG/DL (ref 7–18)
BUN/CREAT RATIO: 4.8 RATIO (ref 10–20)
CALCIUM SERPL-MCNC: 5.9 MG/DL (ref 8.5–10.1)
CARBON DIOXIDE: 10 MMOL/L (ref 21–32)
CHLORIDE: 111 MMOL/L (ref 98–107)
DEPRECATED RDW RBC: 43.8 FL (ref 35.1–43.9)
ERYTHROCYTE [DISTWIDTH] IN BLOOD: 14.4 % (ref 11.6–14.6)
EST GLOM FILT RATE - AFR AMER: 3 ML/MIN (ref 60–?)
ESTIMATED CREATININE CLEARANCE: 3.66 ML/MIN
GLUCOSE: 91 MG/DL (ref 74–106)
HCT VFR BLD AUTO: 22.3 % (ref 37–47)
HGB BLD-MCNC: 8 G/DL (ref 12–15)
MCV RBC: 83.8 FL (ref 81–99)
MEAN CORP HGB CONC: 35.9 G/DL (ref 32–36)
MEAN PLATELET VOL.: 11.9 FL (ref 6.2–12)
PLATELET # BLD: 105 K/MM3 (ref 150–450)
POTASSIUM: 3.5 MMOL/L (ref 3.5–5.1)
RBC # BLD AUTO: 2.66 M/MM3 (ref 4.2–5.4)
WBC # BLD AUTO: 7.9 K/MM3 (ref 4.4–11)

## 2024-01-13 RX ADMIN — SODIUM CHLORIDE 1000 ML: 9 INJECTION, SOLUTION INTRAVENOUS at 08:25

## 2024-01-13 RX ADMIN — PANTOPRAZOLE SODIUM 330 MG: 40 INJECTION, POWDER, FOR SOLUTION INTRAVENOUS at 22:19

## 2024-01-13 RX ADMIN — Medication 6 BAG: at 08:24

## 2024-01-13 RX ADMIN — PANTOPRAZOLE SODIUM 330 MG: 40 INJECTION, POWDER, FOR SOLUTION INTRAVENOUS at 12:21

## 2024-01-13 RX ADMIN — SODIUM CHLORIDE, PRESERVATIVE FREE 0 ML: 5 INJECTION INTRAVENOUS at 11:26

## 2024-01-14 VITALS
DIASTOLIC BLOOD PRESSURE: 87 MMHG | TEMPERATURE: 99.32 F | RESPIRATION RATE: 16 BRPM | SYSTOLIC BLOOD PRESSURE: 154 MMHG | HEART RATE: 108 BPM | OXYGEN SATURATION: 100 %

## 2024-01-14 VITALS
RESPIRATION RATE: 16 BRPM | OXYGEN SATURATION: 100 % | HEART RATE: 98 BPM | TEMPERATURE: 98.8 F | SYSTOLIC BLOOD PRESSURE: 134 MMHG | DIASTOLIC BLOOD PRESSURE: 79 MMHG

## 2024-01-14 VITALS
OXYGEN SATURATION: 100 % | HEART RATE: 87 BPM | RESPIRATION RATE: 16 BRPM | TEMPERATURE: 98.06 F | SYSTOLIC BLOOD PRESSURE: 146 MMHG | DIASTOLIC BLOOD PRESSURE: 79 MMHG

## 2024-01-14 VITALS
RESPIRATION RATE: 16 BRPM | TEMPERATURE: 98.78 F | SYSTOLIC BLOOD PRESSURE: 158 MMHG | HEART RATE: 93 BPM | OXYGEN SATURATION: 100 % | DIASTOLIC BLOOD PRESSURE: 92 MMHG

## 2024-01-14 VITALS
TEMPERATURE: 98.96 F | HEART RATE: 94 BPM | OXYGEN SATURATION: 100 % | RESPIRATION RATE: 16 BRPM | SYSTOLIC BLOOD PRESSURE: 144 MMHG | DIASTOLIC BLOOD PRESSURE: 70 MMHG

## 2024-01-14 VITALS — HEART RATE: 99 BPM

## 2024-01-14 VITALS — HEART RATE: 93 BPM

## 2024-01-14 VITALS — HEART RATE: 90 BPM

## 2024-01-14 LAB
ALBUMIN SERPL-MCNC: 2.5 G/DL (ref 3.2–5)
BUN SERPL-MCNC: 57 MG/DL (ref 7–18)
BUN/CREAT RATIO: 4.9 RATIO (ref 10–20)
CALCIUM SERPL-MCNC: 5.9 MG/DL (ref 8.5–10.1)
CARBON DIOXIDE: 17 MMOL/L (ref 21–32)
CHLORIDE: 108 MMOL/L (ref 98–107)
EST GLOM FILT RATE - AFR AMER: 4 ML/MIN (ref 60–?)
ESTIMATED CREATININE CLEARANCE: 5.29 ML/MIN
GLUCOSE: 89 MG/DL (ref 74–106)
POTASSIUM: 3 MMOL/L (ref 3.5–5.1)

## 2024-01-14 RX ADMIN — PANTOPRAZOLE SODIUM 330 MG: 40 INJECTION, POWDER, FOR SOLUTION INTRAVENOUS at 21:59

## 2024-01-14 RX ADMIN — PANTOPRAZOLE SODIUM 330 MG: 40 INJECTION, POWDER, FOR SOLUTION INTRAVENOUS at 09:11

## 2024-01-14 RX ADMIN — CALCIUM GLUCONATE 60 GM: 98 INJECTION, SOLUTION INTRAVENOUS at 14:19

## 2024-01-14 RX ADMIN — SODIUM CHLORIDE, PRESERVATIVE FREE 0 ML: 5 INJECTION INTRAVENOUS at 09:11

## 2024-01-15 VITALS — HEART RATE: 83 BPM

## 2024-01-15 VITALS
SYSTOLIC BLOOD PRESSURE: 161 MMHG | TEMPERATURE: 98.4 F | RESPIRATION RATE: 13 BRPM | HEART RATE: 73 BPM | DIASTOLIC BLOOD PRESSURE: 90 MMHG

## 2024-01-15 VITALS — HEART RATE: 75 BPM | RESPIRATION RATE: 14 BRPM | DIASTOLIC BLOOD PRESSURE: 96 MMHG | SYSTOLIC BLOOD PRESSURE: 170 MMHG

## 2024-01-15 VITALS
HEART RATE: 86 BPM | OXYGEN SATURATION: 100 % | SYSTOLIC BLOOD PRESSURE: 133 MMHG | TEMPERATURE: 98.6 F | DIASTOLIC BLOOD PRESSURE: 86 MMHG | RESPIRATION RATE: 16 BRPM

## 2024-01-15 VITALS — HEART RATE: 70 BPM | DIASTOLIC BLOOD PRESSURE: 95 MMHG | RESPIRATION RATE: 14 BRPM | SYSTOLIC BLOOD PRESSURE: 163 MMHG

## 2024-01-15 VITALS — RESPIRATION RATE: 13 BRPM | HEART RATE: 72 BPM | DIASTOLIC BLOOD PRESSURE: 96 MMHG | SYSTOLIC BLOOD PRESSURE: 167 MMHG

## 2024-01-15 VITALS
SYSTOLIC BLOOD PRESSURE: 184 MMHG | OXYGEN SATURATION: 100 % | TEMPERATURE: 98.24 F | HEART RATE: 87 BPM | DIASTOLIC BLOOD PRESSURE: 95 MMHG | RESPIRATION RATE: 16 BRPM

## 2024-01-15 VITALS
SYSTOLIC BLOOD PRESSURE: 148 MMHG | RESPIRATION RATE: 16 BRPM | DIASTOLIC BLOOD PRESSURE: 84 MMHG | HEART RATE: 84 BPM | OXYGEN SATURATION: 100 % | TEMPERATURE: 98.4 F

## 2024-01-15 VITALS — RESPIRATION RATE: 13 BRPM | HEART RATE: 78 BPM | SYSTOLIC BLOOD PRESSURE: 175 MMHG | DIASTOLIC BLOOD PRESSURE: 94 MMHG

## 2024-01-15 VITALS
SYSTOLIC BLOOD PRESSURE: 159 MMHG | HEART RATE: 77 BPM | TEMPERATURE: 98.3 F | DIASTOLIC BLOOD PRESSURE: 67 MMHG | RESPIRATION RATE: 13 BRPM | OXYGEN SATURATION: 100 %

## 2024-01-15 VITALS
HEART RATE: 77 BPM | DIASTOLIC BLOOD PRESSURE: 79 MMHG | RESPIRATION RATE: 16 BRPM | TEMPERATURE: 98.24 F | OXYGEN SATURATION: 100 % | SYSTOLIC BLOOD PRESSURE: 166 MMHG

## 2024-01-15 VITALS
RESPIRATION RATE: 16 BRPM | TEMPERATURE: 97.88 F | DIASTOLIC BLOOD PRESSURE: 76 MMHG | SYSTOLIC BLOOD PRESSURE: 158 MMHG | HEART RATE: 79 BPM | OXYGEN SATURATION: 100 %

## 2024-01-15 VITALS — HEART RATE: 94 BPM

## 2024-01-15 VITALS
DIASTOLIC BLOOD PRESSURE: 96 MMHG | SYSTOLIC BLOOD PRESSURE: 163 MMHG | OXYGEN SATURATION: 100 % | RESPIRATION RATE: 15 BRPM | HEART RATE: 73 BPM

## 2024-01-15 VITALS — HEART RATE: 78 BPM

## 2024-01-15 LAB
ALBUMIN SERPL-MCNC: 2.3 G/DL (ref 3.2–5)
BUN SERPL-MCNC: 62 MG/DL (ref 7–18)
BUN/CREAT RATIO: 4.7 RATIO (ref 10–20)
CALCIUM SERPL-MCNC: 5.7 MG/DL (ref 8.5–10.1)
CARBON DIOXIDE: 17 MMOL/L (ref 21–32)
CHLORIDE: 111 MMOL/L (ref 98–107)
EST GLOM FILT RATE - AFR AMER: 4 ML/MIN (ref 60–?)
ESTIMATED CREATININE CLEARANCE: 4.69 ML/MIN
GLUCOSE: 94 MG/DL (ref 74–106)
POTASSIUM: 3 MMOL/L (ref 3.5–5.1)
PTHIN: 1259 PG/ML (ref 18.4–80.1)

## 2024-01-15 RX ADMIN — PANTOPRAZOLE SODIUM 330 MG: 40 INJECTION, POWDER, FOR SOLUTION INTRAVENOUS at 22:23

## 2024-01-15 RX ADMIN — SODIUM CHLORIDE, PRESERVATIVE FREE 0 ML: 5 INJECTION INTRAVENOUS at 08:12

## 2024-01-15 RX ADMIN — PANTOPRAZOLE SODIUM 330 MG: 40 INJECTION, POWDER, FOR SOLUTION INTRAVENOUS at 11:28

## 2024-01-15 RX ADMIN — CALCIUM GLUCONATE 60 GM: 98 INJECTION, SOLUTION INTRAVENOUS at 13:55

## 2024-01-15 RX ADMIN — SODIUM CHLORIDE 1000 ML: 9 INJECTION, SOLUTION INTRAVENOUS at 08:10

## 2024-01-15 RX ADMIN — Medication 6 BAG: at 08:10

## 2024-01-15 RX ADMIN — CALCIUM GLUCONATE 60 GM: 98 INJECTION, SOLUTION INTRAVENOUS at 11:52

## 2024-01-16 VITALS
SYSTOLIC BLOOD PRESSURE: 164 MMHG | DIASTOLIC BLOOD PRESSURE: 91 MMHG | OXYGEN SATURATION: 100 % | HEART RATE: 81 BPM | TEMPERATURE: 98.06 F | RESPIRATION RATE: 18 BRPM

## 2024-01-16 VITALS
OXYGEN SATURATION: 100 % | HEART RATE: 83 BPM | SYSTOLIC BLOOD PRESSURE: 135 MMHG | DIASTOLIC BLOOD PRESSURE: 74 MMHG | TEMPERATURE: 98.42 F | RESPIRATION RATE: 18 BRPM

## 2024-01-16 VITALS
OXYGEN SATURATION: 99 % | RESPIRATION RATE: 16 BRPM | DIASTOLIC BLOOD PRESSURE: 65 MMHG | SYSTOLIC BLOOD PRESSURE: 137 MMHG | HEART RATE: 79 BPM | TEMPERATURE: 97.8 F

## 2024-01-16 LAB
ALBUMIN SERPL-MCNC: 2.7 G/DL (ref 3.2–5)
BUN SERPL-MCNC: 45 MG/DL (ref 7–18)
BUN/CREAT RATIO: 4.2 RATIO (ref 10–20)
CALCIUM SERPL-MCNC: 7.1 MG/DL (ref 8.5–10.1)
CARBON DIOXIDE: 20 MMOL/L (ref 21–32)
CHLORIDE: 110 MMOL/L (ref 98–107)
EST GLOM FILT RATE - AFR AMER: 5 ML/MIN (ref 60–?)
ESTIMATED CREATININE CLEARANCE: 5.81 ML/MIN
GLUCOSE: 96 MG/DL (ref 74–106)
POTASSIUM: 3.3 MMOL/L (ref 3.5–5.1)

## 2024-01-23 ENCOUNTER — TELEPHONE (OUTPATIENT)
Dept: TRANSPLANT | Facility: HOSPITAL | Age: 49
End: 2024-01-23
Payer: COMMERCIAL

## 2024-01-23 ENCOUNTER — DOCUMENTATION (OUTPATIENT)
Dept: TRANSPLANT | Facility: HOSPITAL | Age: 49
End: 2024-01-23
Payer: COMMERCIAL

## 2024-01-23 VITALS — WEIGHT: 142.2 LBS | BODY MASS INDEX: 23.69 KG/M2 | HEIGHT: 65 IN

## 2024-01-23 DIAGNOSIS — N18.6 ESRD (END STAGE RENAL DISEASE) (MULTI): Primary | ICD-10-CM

## 2024-01-23 NOTE — PROGRESS NOTES
Do you have difficulty reading or writing in English?   no   What is the primary cause of your kidney disease?  Unknown  Are you currently on dialysis?   yes  If yes, what days do you have your dialysis treatments?   M,W,F   Have you received a transplant before?   no  If yes, what organ, and when and where was your transplant?     Have you been diagnosed with diabetes?    no  Have you tested positive for hepatitis or HIV?   no  Have you ever been diagnosed with cancer?   no  If yes, what type of cancer, and when and where were you treated?     Do you have a history of a heart attack or stroke?   No  Are you currently or have you previously been seen by a mental health professional?   no  If yes, what is the name of your mental health provider?     Are you a current or former tobacco user?   yes, Former, Quit 2 Weeks Ago  Do you have history of alcohol abuse or dependence?   no  Do you have a history of illegal drug abuse or dependence?   no  Has anyone told you they're willing to donate their kidney to you?  Yes  Comments:  Intake complete, scheduled virtual edu.

## 2024-02-01 ENCOUNTER — HOSPITAL ENCOUNTER (OUTPATIENT)
Dept: HOSPITAL 100 - CVS | Age: 49
Discharge: HOME | End: 2024-02-01
Payer: MEDICAID

## 2024-02-01 DIAGNOSIS — N18.6: Primary | ICD-10-CM

## 2024-02-01 PROCEDURE — 93985 DUP-SCAN HEMO COMPL BI STD: CPT

## 2024-04-26 ENCOUNTER — TELEPHONE (OUTPATIENT)
Dept: TRANSPLANT | Facility: HOSPITAL | Age: 49
End: 2024-04-26
Payer: COMMERCIAL

## 2024-10-15 ENCOUNTER — DOCUMENTATION (OUTPATIENT)
Dept: TRANSPLANT | Facility: HOSPITAL | Age: 49
End: 2024-10-15
Payer: COMMERCIAL

## 2024-10-15 NOTE — PROGRESS NOTES
TRANSPLANT REFERRAL REVIEW NOTE    Date: 10/15/2024  Time: 2:07 PM    Jd Trujillo  was referred by: No referring provider defined for this encounter.     Nephrologist:   Dialysis unit:     [facility] [place] [facility]       This kidney transplant referral was reviewed by:    Surgeon: DR. CHANDNI PHIPPS  Nephrologist: DR. MAG RAYMOND    The recommendation is to proceed with STANDARD kidney transplant evaluation.  Previously scheduled and appeared to no show. One additional opportunity and if no-show again then close.     Reason referral closure: NA    Task was sent to  to scheduled or close referral as above : YES

## 2024-10-29 ENCOUNTER — TELEPHONE (OUTPATIENT)
Dept: TRANSPLANT | Facility: HOSPITAL | Age: 49
End: 2024-10-29
Payer: COMMERCIAL

## 2024-11-06 ENCOUNTER — TELEPHONE (OUTPATIENT)
Dept: TRANSPLANT | Facility: HOSPITAL | Age: 49
End: 2024-11-06
Payer: COMMERCIAL

## 2024-11-12 ENCOUNTER — TELEPHONE (OUTPATIENT)
Dept: TRANSPLANT | Facility: HOSPITAL | Age: 49
End: 2024-11-12
Payer: COMMERCIAL

## 2025-01-08 ENCOUNTER — HOSPITAL ENCOUNTER (INPATIENT)
Dept: HOSPITAL 100 - ED | Age: 50
LOS: 2 days | Discharge: HOME | DRG: 194 | End: 2025-01-10
Payer: MEDICAID

## 2025-01-08 VITALS — OXYGEN SATURATION: 100 %

## 2025-01-08 VITALS
HEART RATE: 97 BPM | SYSTOLIC BLOOD PRESSURE: 185 MMHG | DIASTOLIC BLOOD PRESSURE: 110 MMHG | RESPIRATION RATE: 25 BRPM | OXYGEN SATURATION: 98 %

## 2025-01-08 VITALS
BODY MASS INDEX: 21.6 KG/M2 | BODY MASS INDEX: 22 KG/M2 | BODY MASS INDEX: 26.6 KG/M2 | BODY MASS INDEX: 23.2 KG/M2 | BODY MASS INDEX: 23.3 KG/M2

## 2025-01-08 VITALS
DIASTOLIC BLOOD PRESSURE: 137 MMHG | TEMPERATURE: 98.42 F | HEART RATE: 91 BPM | SYSTOLIC BLOOD PRESSURE: 225 MMHG | RESPIRATION RATE: 20 BRPM | OXYGEN SATURATION: 100 %

## 2025-01-08 VITALS
RESPIRATION RATE: 24 BRPM | TEMPERATURE: 97.6 F | SYSTOLIC BLOOD PRESSURE: 191 MMHG | DIASTOLIC BLOOD PRESSURE: 97 MMHG | HEART RATE: 97 BPM | OXYGEN SATURATION: 100 %

## 2025-01-08 VITALS — SYSTOLIC BLOOD PRESSURE: 235 MMHG | DIASTOLIC BLOOD PRESSURE: 136 MMHG | HEART RATE: 102 BPM

## 2025-01-08 VITALS
SYSTOLIC BLOOD PRESSURE: 210 MMHG | RESPIRATION RATE: 18 BRPM | HEART RATE: 90 BPM | OXYGEN SATURATION: 94 % | DIASTOLIC BLOOD PRESSURE: 124 MMHG | TEMPERATURE: 98.2 F

## 2025-01-08 VITALS
DIASTOLIC BLOOD PRESSURE: 141 MMHG | HEART RATE: 98 BPM | RESPIRATION RATE: 26 BRPM | OXYGEN SATURATION: 96 % | SYSTOLIC BLOOD PRESSURE: 213 MMHG

## 2025-01-08 VITALS — OXYGEN SATURATION: 96 %

## 2025-01-08 VITALS — OXYGEN SATURATION: 94 %

## 2025-01-08 DIAGNOSIS — E87.1: ICD-10-CM

## 2025-01-08 DIAGNOSIS — I16.1: ICD-10-CM

## 2025-01-08 DIAGNOSIS — N18.6: ICD-10-CM

## 2025-01-08 DIAGNOSIS — D64.9: ICD-10-CM

## 2025-01-08 DIAGNOSIS — Z79.899: ICD-10-CM

## 2025-01-08 DIAGNOSIS — I13.2: Primary | ICD-10-CM

## 2025-01-08 DIAGNOSIS — I50.31: ICD-10-CM

## 2025-01-08 DIAGNOSIS — F17.200: ICD-10-CM

## 2025-01-08 DIAGNOSIS — Z99.2: ICD-10-CM

## 2025-01-08 LAB
ALANINE AMINOTRANSFER ALT/SGPT: 43 U/L (ref 13–56)
ALBUMIN SERPL-MCNC: 3.3 G/DL (ref 3.2–5)
ALKALINE PHOSPHATASE: 54 U/L (ref 45–117)
ANION GAP: 8 (ref 5–15)
AST(SGOT): 80 U/L (ref 15–37)
BNP,B-TYPE NATRIURETIC PEPTIDE: 3593.2 PG/ML (ref 0–100)
BUN SERPL-MCNC: 47 MG/DL (ref 7–18)
BUN/CREAT RATIO: 3.5 RATIO (ref 10–20)
CALCIUM SERPL-MCNC: 8.3 MG/DL (ref 8.5–10.1)
CARBON DIOXIDE: 25 MMOL/L (ref 21–32)
CHLORIDE: 97 MMOL/L (ref 98–107)
DEPRECATED RDW RBC: 47.2 FL (ref 35.1–43.9)
ERYTHROCYTE [DISTWIDTH] IN BLOOD: 16.4 % (ref 11.6–14.6)
EST GLOM FILT RATE - AFR AMER: 4 ML/MIN (ref 60–?)
ESTIMATED CREATININE CLEARANCE: 4.32 ML/MIN
GLOBULIN: 2.9 G/DL (ref 2.2–4.2)
GLUCOSE: 120 MG/DL (ref 74–106)
HCT VFR BLD AUTO: 22.6 % (ref 37–47)
HEMOGLOBIN: 8.4 G/DL (ref 12–15)
HGB BLD-MCNC: 8.4 G/DL (ref 12–15)
IMMATURE GRANULOCYTES COUNT: 0.02 X10^3/UL (ref 0–0)
MAGNESIUM: 2.6 MG/DL (ref 1.6–2.6)
MCV RBC: 79.6 FL (ref 81–99)
MEAN CORP HGB CONC: 37.2 G/DL (ref 32–36)
MEAN PLATELET VOL.: 9.2 FL (ref 6.2–12)
NRBC FLAGGED BY ANALYZER: 0 % (ref 0–5)
PLATELET # BLD: 134 K/MM3 (ref 150–450)
PLATELET COUNT: 134 K/MM3 (ref 150–450)
POTASSIUM: 4 MMOL/L (ref 3.5–5.1)
RBC # BLD AUTO: 2.84 M/MM3 (ref 4.2–5.4)
RBC DISTRIBUTION WIDTH CV: 16.4 % (ref 11.6–14.6)
RBC DISTRIBUTION WIDTH SD: 47.2 FL (ref 35.1–43.9)
TROPONIN-I HS: 137 PG/ML (ref 3–54)
TROPONIN-I HS: 156 PG/ML (ref 3–54)
WBC # BLD AUTO: 4.9 K/MM3 (ref 4.4–11)
WHITE BLOOD COUNT: 4.9 K/MM3 (ref 4.4–11)

## 2025-01-08 PROCEDURE — 85025 COMPLETE CBC W/AUTO DIFF WBC: CPT

## 2025-01-08 PROCEDURE — 84100 ASSAY OF PHOSPHORUS: CPT

## 2025-01-08 PROCEDURE — 71046 X-RAY EXAM CHEST 2 VIEWS: CPT

## 2025-01-08 PROCEDURE — 99284 EMERGENCY DEPT VISIT MOD MDM: CPT

## 2025-01-08 PROCEDURE — 83880 ASSAY OF NATRIURETIC PEPTIDE: CPT

## 2025-01-08 PROCEDURE — 93005 ELECTROCARDIOGRAM TRACING: CPT

## 2025-01-08 PROCEDURE — 84484 ASSAY OF TROPONIN QUANT: CPT

## 2025-01-08 PROCEDURE — 90937 HEMODIALYSIS REPEATED EVAL: CPT

## 2025-01-08 PROCEDURE — 80053 COMPREHEN METABOLIC PANEL: CPT

## 2025-01-08 PROCEDURE — 93306 TTE W/DOPPLER COMPLETE: CPT

## 2025-01-08 PROCEDURE — 85027 COMPLETE CBC AUTOMATED: CPT

## 2025-01-08 PROCEDURE — G0257 UNSCHED DIALYSIS ESRD PT HOS: HCPCS

## 2025-01-08 PROCEDURE — A4216 STERILE WATER/SALINE, 10 ML: HCPCS

## 2025-01-08 PROCEDURE — 36415 COLL VENOUS BLD VENIPUNCTURE: CPT

## 2025-01-08 PROCEDURE — 80048 BASIC METABOLIC PNL TOTAL CA: CPT

## 2025-01-08 PROCEDURE — 87631 RESP VIRUS 3-5 TARGETS: CPT

## 2025-01-08 PROCEDURE — 83735 ASSAY OF MAGNESIUM: CPT

## 2025-01-08 RX ADMIN — HEPARIN SODIUM 5000 UNIT: 5000 INJECTION, SOLUTION INTRAVENOUS; SUBCUTANEOUS at 22:09

## 2025-01-09 VITALS
TEMPERATURE: 98.78 F | RESPIRATION RATE: 18 BRPM | DIASTOLIC BLOOD PRESSURE: 121 MMHG | OXYGEN SATURATION: 99 % | HEART RATE: 91 BPM | SYSTOLIC BLOOD PRESSURE: 213 MMHG

## 2025-01-09 VITALS — SYSTOLIC BLOOD PRESSURE: 185 MMHG | HEART RATE: 84 BPM | DIASTOLIC BLOOD PRESSURE: 93 MMHG | RESPIRATION RATE: 16 BRPM

## 2025-01-09 VITALS
RESPIRATION RATE: 14 BRPM | OXYGEN SATURATION: 100 % | SYSTOLIC BLOOD PRESSURE: 176 MMHG | HEART RATE: 86 BPM | TEMPERATURE: 98.6 F | DIASTOLIC BLOOD PRESSURE: 105 MMHG

## 2025-01-09 VITALS — SYSTOLIC BLOOD PRESSURE: 175 MMHG | RESPIRATION RATE: 15 BRPM | DIASTOLIC BLOOD PRESSURE: 85 MMHG | HEART RATE: 86 BPM

## 2025-01-09 VITALS — RESPIRATION RATE: 15 BRPM | DIASTOLIC BLOOD PRESSURE: 106 MMHG | SYSTOLIC BLOOD PRESSURE: 175 MMHG | HEART RATE: 79 BPM

## 2025-01-09 VITALS — DIASTOLIC BLOOD PRESSURE: 87 MMHG | SYSTOLIC BLOOD PRESSURE: 177 MMHG | HEART RATE: 84 BPM | RESPIRATION RATE: 14 BRPM

## 2025-01-09 VITALS
HEART RATE: 91 BPM | TEMPERATURE: 97.7 F | SYSTOLIC BLOOD PRESSURE: 177 MMHG | OXYGEN SATURATION: 100 % | DIASTOLIC BLOOD PRESSURE: 99 MMHG | RESPIRATION RATE: 12 BRPM

## 2025-01-09 VITALS — HEART RATE: 97 BPM | SYSTOLIC BLOOD PRESSURE: 220 MMHG | DIASTOLIC BLOOD PRESSURE: 120 MMHG

## 2025-01-09 VITALS — SYSTOLIC BLOOD PRESSURE: 213 MMHG | DIASTOLIC BLOOD PRESSURE: 121 MMHG | HEART RATE: 91 BPM

## 2025-01-09 VITALS — DIASTOLIC BLOOD PRESSURE: 121 MMHG | SYSTOLIC BLOOD PRESSURE: 213 MMHG | HEART RATE: 91 BPM

## 2025-01-09 VITALS
DIASTOLIC BLOOD PRESSURE: 93 MMHG | RESPIRATION RATE: 20 BRPM | TEMPERATURE: 98.96 F | HEART RATE: 94 BPM | OXYGEN SATURATION: 98 % | SYSTOLIC BLOOD PRESSURE: 187 MMHG

## 2025-01-09 VITALS — DIASTOLIC BLOOD PRESSURE: 99 MMHG | SYSTOLIC BLOOD PRESSURE: 177 MMHG | HEART RATE: 90 BPM

## 2025-01-09 VITALS
RESPIRATION RATE: 14 BRPM | TEMPERATURE: 97.88 F | SYSTOLIC BLOOD PRESSURE: 167 MMHG | OXYGEN SATURATION: 100 % | HEART RATE: 92 BPM | DIASTOLIC BLOOD PRESSURE: 93 MMHG

## 2025-01-09 VITALS
OXYGEN SATURATION: 100 % | SYSTOLIC BLOOD PRESSURE: 153 MMHG | HEART RATE: 83 BPM | RESPIRATION RATE: 15 BRPM | DIASTOLIC BLOOD PRESSURE: 87 MMHG

## 2025-01-09 VITALS
SYSTOLIC BLOOD PRESSURE: 194 MMHG | OXYGEN SATURATION: 99 % | RESPIRATION RATE: 18 BRPM | DIASTOLIC BLOOD PRESSURE: 110 MMHG | HEART RATE: 91 BPM | TEMPERATURE: 99.5 F

## 2025-01-09 VITALS — DIASTOLIC BLOOD PRESSURE: 108 MMHG | SYSTOLIC BLOOD PRESSURE: 182 MMHG | HEART RATE: 91 BPM

## 2025-01-09 VITALS — SYSTOLIC BLOOD PRESSURE: 187 MMHG | HEART RATE: 94 BPM | DIASTOLIC BLOOD PRESSURE: 93 MMHG

## 2025-01-09 VITALS — DIASTOLIC BLOOD PRESSURE: 110 MMHG | HEART RATE: 91 BPM | SYSTOLIC BLOOD PRESSURE: 194 MMHG

## 2025-01-09 VITALS — OXYGEN SATURATION: 97 %

## 2025-01-09 LAB
ANION GAP: 11 (ref 5–15)
BUN SERPL-MCNC: 53 MG/DL (ref 7–18)
BUN/CREAT RATIO: 3.5 RATIO (ref 10–20)
CALCIUM SERPL-MCNC: 8.6 MG/DL (ref 8.5–10.1)
CARBON DIOXIDE: 22 MMOL/L (ref 21–32)
CHLORIDE: 96 MMOL/L (ref 98–107)
DEPRECATED RDW RBC: 49.7 FL (ref 35.1–43.9)
ERYTHROCYTE [DISTWIDTH] IN BLOOD: 16.1 % (ref 11.6–14.6)
EST GLOM FILT RATE - AFR AMER: 3 ML/MIN (ref 60–?)
ESTIMATED CREATININE CLEARANCE: 3.92 ML/MIN
GLUCOSE: 96 MG/DL (ref 74–106)
HCT VFR BLD AUTO: 23.5 % (ref 37–47)
HEMOGLOBIN: 8.4 G/DL (ref 12–15)
HGB BLD-MCNC: 8.4 G/DL (ref 12–15)
MCV RBC: 83.6 FL (ref 81–99)
MEAN CORP HGB CONC: 35.7 G/DL (ref 32–36)
MEAN PLATELET VOL.: 10.5 FL (ref 6.2–12)
PLATELET # BLD: 143 K/MM3 (ref 150–450)
PLATELET COUNT: 143 K/MM3 (ref 150–450)
POTASSIUM: 4.3 MMOL/L (ref 3.5–5.1)
RBC # BLD AUTO: 2.81 M/MM3 (ref 4.2–5.4)
RBC DISTRIBUTION WIDTH CV: 16.1 % (ref 11.6–14.6)
RBC DISTRIBUTION WIDTH SD: 49.7 FL (ref 35.1–43.9)
WBC # BLD AUTO: 5.9 K/MM3 (ref 4.4–11)
WHITE BLOOD COUNT: 5.9 K/MM3 (ref 4.4–11)

## 2025-01-09 RX ADMIN — METOPROLOL SUCCINATE 100 MG: 100 TABLET, EXTENDED RELEASE ORAL at 10:18

## 2025-01-09 RX ADMIN — SODIUM CHLORIDE, PRESERVATIVE FREE 0 ML: 5 INJECTION INTRAVENOUS at 12:20

## 2025-01-09 RX ADMIN — SODIUM CHLORIDE 1000 ML: 9 INJECTION, SOLUTION INTRAVENOUS at 16:41

## 2025-01-09 RX ADMIN — SODIUM CHLORIDE, PRESERVATIVE FREE 0 ML: 5 INJECTION INTRAVENOUS at 16:44

## 2025-01-09 RX ADMIN — SODIUM CHLORIDE, PRESERVATIVE FREE 0 ML: 5 INJECTION INTRAVENOUS at 14:52

## 2025-01-09 RX ADMIN — SODIUM CHLORIDE, PRESERVATIVE FREE 0 ML: 5 INJECTION INTRAVENOUS at 20:55

## 2025-01-09 RX ADMIN — SODIUM CHLORIDE, PRESERVATIVE FREE 0 ML: 5 INJECTION INTRAVENOUS at 10:29

## 2025-01-09 RX ADMIN — HEPARIN SODIUM 5000 UNIT: 5000 INJECTION, SOLUTION INTRAVENOUS; SUBCUTANEOUS at 10:20

## 2025-01-09 RX ADMIN — HEPARIN SODIUM 5000 UNIT: 5000 INJECTION, SOLUTION INTRAVENOUS; SUBCUTANEOUS at 20:44

## 2025-01-10 VITALS
HEART RATE: 78 BPM | TEMPERATURE: 98 F | RESPIRATION RATE: 16 BRPM | OXYGEN SATURATION: 98 % | DIASTOLIC BLOOD PRESSURE: 95 MMHG | SYSTOLIC BLOOD PRESSURE: 160 MMHG

## 2025-01-10 VITALS — DIASTOLIC BLOOD PRESSURE: 93 MMHG | SYSTOLIC BLOOD PRESSURE: 175 MMHG | HEART RATE: 89 BPM

## 2025-01-10 VITALS
RESPIRATION RATE: 16 BRPM | SYSTOLIC BLOOD PRESSURE: 203 MMHG | TEMPERATURE: 98.4 F | HEART RATE: 82 BPM | DIASTOLIC BLOOD PRESSURE: 111 MMHG | OXYGEN SATURATION: 100 %

## 2025-01-10 VITALS
OXYGEN SATURATION: 99 % | DIASTOLIC BLOOD PRESSURE: 95 MMHG | SYSTOLIC BLOOD PRESSURE: 160 MMHG | HEART RATE: 78 BPM | RESPIRATION RATE: 16 BRPM | TEMPERATURE: 98 F

## 2025-01-10 VITALS — SYSTOLIC BLOOD PRESSURE: 196 MMHG | HEART RATE: 90 BPM | DIASTOLIC BLOOD PRESSURE: 99 MMHG

## 2025-01-10 VITALS — HEART RATE: 90 BPM | DIASTOLIC BLOOD PRESSURE: 72 MMHG | SYSTOLIC BLOOD PRESSURE: 155 MMHG

## 2025-01-10 VITALS
OXYGEN SATURATION: 100 % | DIASTOLIC BLOOD PRESSURE: 90 MMHG | TEMPERATURE: 97.8 F | RESPIRATION RATE: 18 BRPM | HEART RATE: 84 BPM | SYSTOLIC BLOOD PRESSURE: 178 MMHG

## 2025-01-10 VITALS — SYSTOLIC BLOOD PRESSURE: 203 MMHG | DIASTOLIC BLOOD PRESSURE: 95 MMHG | HEART RATE: 82 BPM

## 2025-01-10 VITALS — HEART RATE: 83 BPM | OXYGEN SATURATION: 100 % | SYSTOLIC BLOOD PRESSURE: 177 MMHG | DIASTOLIC BLOOD PRESSURE: 91 MMHG

## 2025-01-10 VITALS — DIASTOLIC BLOOD PRESSURE: 99 MMHG | HEART RATE: 90 BPM | SYSTOLIC BLOOD PRESSURE: 196 MMHG

## 2025-01-10 VITALS
TEMPERATURE: 98.42 F | HEART RATE: 84 BPM | SYSTOLIC BLOOD PRESSURE: 168 MMHG | OXYGEN SATURATION: 100 % | DIASTOLIC BLOOD PRESSURE: 111 MMHG | RESPIRATION RATE: 15 BRPM

## 2025-01-10 VITALS — HEART RATE: 84 BPM | SYSTOLIC BLOOD PRESSURE: 191 MMHG | DIASTOLIC BLOOD PRESSURE: 95 MMHG

## 2025-01-10 VITALS — DIASTOLIC BLOOD PRESSURE: 93 MMHG | SYSTOLIC BLOOD PRESSURE: 165 MMHG

## 2025-01-10 VITALS — SYSTOLIC BLOOD PRESSURE: 204 MMHG | DIASTOLIC BLOOD PRESSURE: 105 MMHG | HEART RATE: 87 BPM

## 2025-01-10 VITALS — SYSTOLIC BLOOD PRESSURE: 179 MMHG | DIASTOLIC BLOOD PRESSURE: 106 MMHG | HEART RATE: 91 BPM

## 2025-01-10 VITALS — SYSTOLIC BLOOD PRESSURE: 178 MMHG | HEART RATE: 84 BPM | DIASTOLIC BLOOD PRESSURE: 90 MMHG

## 2025-01-10 VITALS — DIASTOLIC BLOOD PRESSURE: 90 MMHG | SYSTOLIC BLOOD PRESSURE: 178 MMHG | HEART RATE: 84 BPM

## 2025-01-10 VITALS — OXYGEN SATURATION: 95 %

## 2025-01-10 LAB
ANION GAP: 10 (ref 5–15)
BUN SERPL-MCNC: 56 MG/DL (ref 7–18)
BUN/CREAT RATIO: 3.6 RATIO (ref 10–20)
CALCIUM SERPL-MCNC: 8.7 MG/DL (ref 8.5–10.1)
CARBON DIOXIDE: 23 MMOL/L (ref 21–32)
CHLORIDE: 98 MMOL/L (ref 98–107)
DEPRECATED RDW RBC: 48.9 FL (ref 35.1–43.9)
ERYTHROCYTE [DISTWIDTH] IN BLOOD: 16.1 % (ref 11.6–14.6)
EST GLOM FILT RATE - AFR AMER: 3 ML/MIN (ref 60–?)
ESTIMATED CREATININE CLEARANCE: 3.77 ML/MIN
GLUCOSE: 141 MG/DL (ref 74–106)
HCT VFR BLD AUTO: 23.4 % (ref 37–47)
HEMOGLOBIN: 8.3 G/DL (ref 12–15)
HGB BLD-MCNC: 8.3 G/DL (ref 12–15)
MAGNESIUM: 2.5 MG/DL (ref 1.6–2.6)
MCV RBC: 83 FL (ref 81–99)
MEAN CORP HGB CONC: 35.5 G/DL (ref 32–36)
MEAN PLATELET VOL.: 9.5 FL (ref 6.2–12)
PLATELET # BLD: 132 K/MM3 (ref 150–450)
PLATELET COUNT: 132 K/MM3 (ref 150–450)
POTASSIUM: 4 MMOL/L (ref 3.5–5.1)
RBC # BLD AUTO: 2.82 M/MM3 (ref 4.2–5.4)
RBC DISTRIBUTION WIDTH CV: 16.1 % (ref 11.6–14.6)
RBC DISTRIBUTION WIDTH SD: 48.9 FL (ref 35.1–43.9)
WBC # BLD AUTO: 5.4 K/MM3 (ref 4.4–11)
WHITE BLOOD COUNT: 5.4 K/MM3 (ref 4.4–11)

## 2025-01-10 RX ADMIN — HEPARIN SODIUM 5000 UNIT: 5000 INJECTION, SOLUTION INTRAVENOUS; SUBCUTANEOUS at 12:15

## 2025-01-10 RX ADMIN — Medication 6 BAG: at 11:31

## 2025-01-10 RX ADMIN — METOPROLOL SUCCINATE 100 MG: 100 TABLET, EXTENDED RELEASE ORAL at 12:15

## 2025-01-10 RX ADMIN — SODIUM CHLORIDE, PRESERVATIVE FREE 0 ML: 5 INJECTION INTRAVENOUS at 11:31

## 2025-01-10 RX ADMIN — SODIUM CHLORIDE, PRESERVATIVE FREE 0 ML: 5 INJECTION INTRAVENOUS at 00:36

## 2025-01-10 RX ADMIN — SODIUM CHLORIDE 1000 ML: 9 INJECTION, SOLUTION INTRAVENOUS at 11:31

## 2025-01-21 ENCOUNTER — DOCUMENTATION (OUTPATIENT)
Facility: HOSPITAL | Age: 50
End: 2025-01-21
Payer: COMMERCIAL

## 2025-01-21 VITALS — WEIGHT: 132.28 LBS | BODY MASS INDEX: 22.04 KG/M2 | HEIGHT: 65 IN

## 2025-02-12 ENCOUNTER — HOSPITAL ENCOUNTER (EMERGENCY)
Age: 50
Discharge: HOME | End: 2025-02-12
Payer: MEDICAID

## 2025-02-12 VITALS
SYSTOLIC BLOOD PRESSURE: 230 MMHG | RESPIRATION RATE: 27 BRPM | OXYGEN SATURATION: 97 % | DIASTOLIC BLOOD PRESSURE: 125 MMHG | HEART RATE: 102 BPM

## 2025-02-12 VITALS
DIASTOLIC BLOOD PRESSURE: 155 MMHG | SYSTOLIC BLOOD PRESSURE: 238 MMHG | TEMPERATURE: 98.4 F | RESPIRATION RATE: 26 BRPM | OXYGEN SATURATION: 96 % | HEART RATE: 26 BPM

## 2025-02-12 VITALS — SYSTOLIC BLOOD PRESSURE: 214 MMHG | RESPIRATION RATE: 11 BRPM | DIASTOLIC BLOOD PRESSURE: 113 MMHG | HEART RATE: 95 BPM

## 2025-02-12 VITALS
HEART RATE: 93 BPM | DIASTOLIC BLOOD PRESSURE: 133 MMHG | SYSTOLIC BLOOD PRESSURE: 229 MMHG | RESPIRATION RATE: 29 BRPM | OXYGEN SATURATION: 98 %

## 2025-02-12 VITALS — RESPIRATION RATE: 18 BRPM | HEART RATE: 98 BPM

## 2025-02-12 VITALS — HEART RATE: 102 BPM | RESPIRATION RATE: 27 BRPM

## 2025-02-12 VITALS — BODY MASS INDEX: 22.2 KG/M2

## 2025-02-12 DIAGNOSIS — D64.9: ICD-10-CM

## 2025-02-12 DIAGNOSIS — Z99.2: ICD-10-CM

## 2025-02-12 DIAGNOSIS — I12.0: ICD-10-CM

## 2025-02-12 DIAGNOSIS — U07.1: Primary | ICD-10-CM

## 2025-02-12 DIAGNOSIS — N18.6: ICD-10-CM

## 2025-02-12 DIAGNOSIS — R51.9: ICD-10-CM

## 2025-02-12 LAB
ANION GAP: 10 (ref 5–15)
BUN SERPL-MCNC: 29 MG/DL (ref 7–18)
BUN/CREAT RATIO: 3 RATIO (ref 10–20)
CALCIUM SERPL-MCNC: 9.5 MG/DL (ref 8.5–10.1)
CARBON DIOXIDE: 25 MMOL/L (ref 21–32)
CHLORIDE: 102 MMOL/L (ref 98–107)
DEPRECATED RDW RBC: 60.3 FL (ref 35.1–43.9)
DIFFERENTIAL INDICATED: (no result)
ERYTHROCYTE [DISTWIDTH] IN BLOOD: 18.7 % (ref 11.6–14.6)
EST GLOM FILT RATE - AFR AMER: 6 ML/MIN (ref 60–?)
ESTIMATED CREATININE CLEARANCE: 6.04 ML/MIN
GLUCOSE: 110 MG/DL (ref 74–106)
HCT VFR BLD AUTO: 33 % (ref 37–47)
HEMOGLOBIN: 11.1 G/DL (ref 12–15)
HGB BLD-MCNC: 11.1 G/DL (ref 12–15)
IMMATURE GRANULOCYTES COUNT: 0.05 X10^3/UL (ref 0–0)
MCV RBC: 88.9 FL (ref 81–99)
MEAN CORP HGB CONC: 33.6 G/DL (ref 32–36)
MEAN PLATELET VOL.: 11.1 FL (ref 6.2–12)
NRBC FLAGGED BY ANALYZER: 0 % (ref 0–5)
PLATELET # BLD: 143 K/MM3 (ref 150–450)
PLATELET COUNT: 143 K/MM3 (ref 150–450)
POSITIVE DIFFERENTIAL: YES
POTASSIUM: 4.5 MMOL/L (ref 3.5–5.1)
RBC # BLD AUTO: 3.71 M/MM3 (ref 4.2–5.4)
RBC DISTRIBUTION WIDTH CV: 18.7 % (ref 11.6–14.6)
RBC DISTRIBUTION WIDTH SD: 60.3 FL (ref 35.1–43.9)
TROPONIN-I HS: 102 PG/ML (ref 3–54)
WBC # BLD AUTO: 6.3 K/MM3 (ref 4.4–11)
WHITE BLOOD COUNT: 6.3 K/MM3 (ref 4.4–11)

## 2025-02-12 PROCEDURE — 80048 BASIC METABOLIC PNL TOTAL CA: CPT

## 2025-02-12 PROCEDURE — 84484 ASSAY OF TROPONIN QUANT: CPT

## 2025-02-12 PROCEDURE — A4216 STERILE WATER/SALINE, 10 ML: HCPCS

## 2025-02-12 PROCEDURE — 71045 X-RAY EXAM CHEST 1 VIEW: CPT

## 2025-02-12 PROCEDURE — 99285 EMERGENCY DEPT VISIT HI MDM: CPT

## 2025-02-12 PROCEDURE — 87631 RESP VIRUS 3-5 TARGETS: CPT

## 2025-02-12 PROCEDURE — 93005 ELECTROCARDIOGRAM TRACING: CPT

## 2025-02-12 PROCEDURE — 85025 COMPLETE CBC W/AUTO DIFF WBC: CPT

## 2025-06-10 ENCOUNTER — HOSPITAL ENCOUNTER (OUTPATIENT)
Age: 50
Discharge: HOME | End: 2025-06-10
Payer: MEDICAID

## 2025-06-10 DIAGNOSIS — F17.200: ICD-10-CM

## 2025-06-10 DIAGNOSIS — N18.6: ICD-10-CM

## 2025-06-10 DIAGNOSIS — Z79.899: ICD-10-CM

## 2025-06-10 DIAGNOSIS — T82.898A: Primary | ICD-10-CM

## 2025-06-10 PROCEDURE — 99152 MOD SED SAME PHYS/QHP 5/>YRS: CPT

## 2025-06-10 PROCEDURE — 37253 INTRVASC US NONCORONARY ADDL: CPT

## 2025-06-10 PROCEDURE — C1753 CATH, INTRAVAS ULTRASOUND: HCPCS

## 2025-06-10 PROCEDURE — C1894 INTRO/SHEATH, NON-LASER: HCPCS

## 2025-06-10 PROCEDURE — 75825 VEIN X-RAY TRUNK: CPT

## 2025-06-10 PROCEDURE — C1769 GUIDE WIRE: HCPCS

## 2025-06-10 PROCEDURE — 37252 INTRVASC US NONCORONARY 1ST: CPT

## 2025-06-10 PROCEDURE — 99153 MOD SED SAME PHYS/QHP EA: CPT

## 2025-06-10 PROCEDURE — 36010 PLACE CATHETER IN VEIN: CPT

## 2025-06-10 PROCEDURE — C1750 CATH, HEMODIALYSIS,LONG-TERM: HCPCS

## 2025-06-10 PROCEDURE — 36590 REMOVAL TUNNELED CV CATH: CPT

## 2025-06-10 PROCEDURE — 77001 FLUOROGUIDE FOR VEIN DEVICE: CPT

## 2025-06-10 PROCEDURE — 36558 INSERT TUNNELED CV CATH: CPT

## 2025-06-14 ENCOUNTER — HOSPITAL ENCOUNTER (EMERGENCY)
Age: 50
Discharge: HOME | End: 2025-06-14
Payer: MEDICAID

## 2025-06-14 VITALS
SYSTOLIC BLOOD PRESSURE: 146 MMHG | DIASTOLIC BLOOD PRESSURE: 84 MMHG | HEART RATE: 81 BPM | TEMPERATURE: 97.7 F | OXYGEN SATURATION: 99 % | RESPIRATION RATE: 19 BRPM

## 2025-06-14 VITALS — BODY MASS INDEX: 24.3 KG/M2

## 2025-06-14 DIAGNOSIS — F17.200: ICD-10-CM

## 2025-06-14 DIAGNOSIS — T82.838A: Primary | ICD-10-CM

## 2025-06-14 DIAGNOSIS — Z99.2: ICD-10-CM

## 2025-06-14 DIAGNOSIS — N18.6: ICD-10-CM
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
## 2025-06-14 PROCEDURE — 99282 EMERGENCY DEPT VISIT SF MDM: CPT
